# Patient Record
Sex: MALE | URBAN - METROPOLITAN AREA
[De-identification: names, ages, dates, MRNs, and addresses within clinical notes are randomized per-mention and may not be internally consistent; named-entity substitution may affect disease eponyms.]

---

## 2024-06-18 ENCOUNTER — APPOINTMENT (OUTPATIENT)
Dept: URBAN - METROPOLITAN AREA CLINIC 205 | Age: 34
Setting detail: DERMATOLOGY
End: 2024-06-18

## 2024-06-18 DIAGNOSIS — L01.01 NON-BULLOUS IMPETIGO: ICD-10-CM

## 2024-06-18 PROCEDURE — OTHER TREATMENT REGIMEN: OTHER

## 2024-06-18 PROCEDURE — OTHER PRESCRIPTION: OTHER

## 2024-06-18 PROCEDURE — OTHER ORDER TESTS: OTHER

## 2024-06-18 PROCEDURE — OTHER COUNSELING: OTHER

## 2024-06-18 PROCEDURE — 99203 OFFICE O/P NEW LOW 30 MIN: CPT

## 2024-06-18 RX ORDER — MUPIROCIN 20 MG/G
OINTMENT TOPICAL
Qty: 22 | Refills: 1 | Status: ERX | COMMUNITY
Start: 2024-06-18

## 2024-06-18 RX ORDER — MINOCYCLINE HYDROCHLORIDE 100 MG/1
CAPSULE ORAL
Qty: 28 | Refills: 0 | Status: ERX | COMMUNITY
Start: 2024-06-18

## 2024-06-18 ASSESSMENT — LOCATION DETAILED DESCRIPTION DERM: LOCATION DETAILED: SUPRAPUBIC SKIN

## 2024-06-18 ASSESSMENT — LOCATION SIMPLE DESCRIPTION DERM: LOCATION SIMPLE: GROIN

## 2024-06-18 ASSESSMENT — LOCATION ZONE DERM: LOCATION ZONE: TRUNK

## 2024-06-18 NOTE — PROCEDURE: TREATMENT REGIMEN
Detail Level: Detailed
Initiate Treatment: mupirocin 2 % topical ointment \\nQuantity: 22.0 g  Days Supply: 30\\nSig: Apply to affected area twice a day\\n\\nminocycline 100 mg capsule \\nQuantity: 28.0 Capsule\\nSig: Take one po twice a day with food\\nI told him he must use sunscreen while taking this as he is more likely to burn.  He agreed to wear sunscreen with zinc reapplying every hour of every day while in the sun.   This was provided in writing and he was given samples of sunscreen.

## 2024-06-18 NOTE — HPI: RASH
How Severe Is Your Rash?: moderate
Is This A New Presentation, Or A Follow-Up?: Rash
Additional History: Pt went to urgent care was put on a steroid cream, steroid pill and given a kenalog shot. Then seemed to work at first but once done with the oral steroid it seemed to come right back full blown

## 2024-06-18 NOTE — PROCEDURE: ORDER TESTS
Performing Laboratory: [id number]
Billing Type: Third-Party Bill
Bill For Surgical Tray: no
Expected Date Of Service: 06/18/2024
Lab Facility: [id number]

## 2025-06-17 ENCOUNTER — APPOINTMENT (OUTPATIENT)
Dept: URBAN - METROPOLITAN AREA CLINIC 205 | Age: 35
Setting detail: DERMATOLOGY
End: 2025-06-18

## 2025-06-17 DIAGNOSIS — B35.3 TINEA PEDIS: ICD-10-CM

## 2025-06-17 DIAGNOSIS — L08.89 OTHER SPECIFIED LOCAL INFECTIONS OF THE SKIN AND SUBCUTANEOUS TISSUE: ICD-10-CM

## 2025-06-17 PROCEDURE — 99213 OFFICE O/P EST LOW 20 MIN: CPT

## 2025-06-17 PROCEDURE — OTHER COUNSELING: OTHER

## 2025-06-17 PROCEDURE — OTHER TREATMENT REGIMEN: OTHER

## 2025-06-17 PROCEDURE — OTHER PRESCRIPTION: OTHER

## 2025-06-17 RX ORDER — KETOCONAZOLE 20 MG/G
CREAM TOPICAL
Qty: 60 | Refills: 0 | Status: ERX | COMMUNITY
Start: 2025-06-17

## 2025-06-17 ASSESSMENT — LOCATION ZONE DERM
LOCATION ZONE: TOE
LOCATION ZONE: FEET

## 2025-06-17 ASSESSMENT — LOCATION DETAILED DESCRIPTION DERM
LOCATION DETAILED: LEFT MEDIAL 3RD TOE
LOCATION DETAILED: LEFT MEDIAL 5TH TOE
LOCATION DETAILED: LEFT DORSAL 4TH TOE
LOCATION DETAILED: 1ST WEBSPACE RIGHT FOOT
LOCATION DETAILED: RIGHT DORSAL 3RD TOE
LOCATION DETAILED: 1ST WEBSPACE LEFT FOOT
LOCATION DETAILED: 4TH WEBSPACE RIGHT FOOT
LOCATION DETAILED: 2ND WEBSPACE RIGHT FOOT

## 2025-06-17 ASSESSMENT — LOCATION SIMPLE DESCRIPTION DERM
LOCATION SIMPLE: LEFT 4TH TOE
LOCATION SIMPLE: RIGHT 3RD TOE
LOCATION SIMPLE: RIGHT FOOT
LOCATION SIMPLE: LEFT 5TH TOE
LOCATION SIMPLE: LEFT 3RD TOE
LOCATION SIMPLE: LEFT FOOT

## 2025-06-18 RX ORDER — CLINDAMYCIN PHOSPHATE 10 MG/G
GEL TOPICAL
Qty: 60 | Refills: 1 | Status: ERX | COMMUNITY
Start: 2025-06-18